# Patient Record
Sex: MALE | URBAN - METROPOLITAN AREA
[De-identification: names, ages, dates, MRNs, and addresses within clinical notes are randomized per-mention and may not be internally consistent; named-entity substitution may affect disease eponyms.]

---

## 2022-09-13 ENCOUNTER — OFFICE VISIT (OUTPATIENT)
Dept: FAMILY MEDICINE CLINIC | Age: 37
End: 2022-09-13
Payer: MEDICAID

## 2022-09-13 VITALS
DIASTOLIC BLOOD PRESSURE: 82 MMHG | HEIGHT: 65 IN | BODY MASS INDEX: 35.16 KG/M2 | WEIGHT: 211 LBS | HEART RATE: 81 BPM | OXYGEN SATURATION: 98 % | SYSTOLIC BLOOD PRESSURE: 136 MMHG

## 2022-09-13 DIAGNOSIS — Z76.89 ENCOUNTER TO ESTABLISH CARE: Primary | ICD-10-CM

## 2022-09-13 DIAGNOSIS — S76.012A STRAIN OF FLEXOR MUSCLE OF LEFT HIP, INITIAL ENCOUNTER: ICD-10-CM

## 2022-09-13 DIAGNOSIS — L91.0 KELOID OF SKIN: ICD-10-CM

## 2022-09-13 PROCEDURE — 99204 OFFICE O/P NEW MOD 45 MIN: CPT

## 2022-09-13 RX ORDER — BACLOFEN 10 MG/1
10 TABLET ORAL NIGHTLY PRN
Qty: 30 TABLET | Refills: 0 | Status: SHIPPED | OUTPATIENT
Start: 2022-09-13

## 2022-09-13 RX ORDER — METHYLPREDNISOLONE 4 MG/1
TABLET ORAL
Qty: 1 KIT | Refills: 0 | Status: SHIPPED | OUTPATIENT
Start: 2022-09-13 | End: 2022-09-19

## 2022-09-13 RX ORDER — TRIAMCINOLONE ACETONIDE 0.25 MG/G
OINTMENT TOPICAL 2 TIMES DAILY
Qty: 80 G | Refills: 1 | Status: SHIPPED | OUTPATIENT
Start: 2022-09-13 | End: 2022-09-20

## 2022-09-13 SDOH — ECONOMIC STABILITY: FOOD INSECURITY: WITHIN THE PAST 12 MONTHS, THE FOOD YOU BOUGHT JUST DIDN'T LAST AND YOU DIDN'T HAVE MONEY TO GET MORE.: NEVER TRUE

## 2022-09-13 SDOH — ECONOMIC STABILITY: TRANSPORTATION INSECURITY
IN THE PAST 12 MONTHS, HAS LACK OF TRANSPORTATION KEPT YOU FROM MEETINGS, WORK, OR FROM GETTING THINGS NEEDED FOR DAILY LIVING?: NO

## 2022-09-13 SDOH — ECONOMIC STABILITY: TRANSPORTATION INSECURITY
IN THE PAST 12 MONTHS, HAS THE LACK OF TRANSPORTATION KEPT YOU FROM MEDICAL APPOINTMENTS OR FROM GETTING MEDICATIONS?: NO

## 2022-09-13 SDOH — ECONOMIC STABILITY: FOOD INSECURITY: WITHIN THE PAST 12 MONTHS, YOU WORRIED THAT YOUR FOOD WOULD RUN OUT BEFORE YOU GOT MONEY TO BUY MORE.: NEVER TRUE

## 2022-09-13 ASSESSMENT — ENCOUNTER SYMPTOMS
EYE REDNESS: 0
SINUS PRESSURE: 0
NAUSEA: 0
ABDOMINAL PAIN: 0
WHEEZING: 0
COUGH: 0
ABDOMINAL DISTENTION: 0
SORE THROAT: 0
SINUS PAIN: 0
EYE DISCHARGE: 0
BACK PAIN: 0
DIARRHEA: 0
SHORTNESS OF BREATH: 0
EYE PAIN: 0
VOMITING: 0

## 2022-09-13 ASSESSMENT — PATIENT HEALTH QUESTIONNAIRE - PHQ9
1. LITTLE INTEREST OR PLEASURE IN DOING THINGS: 0
2. FEELING DOWN, DEPRESSED OR HOPELESS: 0
SUM OF ALL RESPONSES TO PHQ QUESTIONS 1-9: 0
SUM OF ALL RESPONSES TO PHQ9 QUESTIONS 1 & 2: 0
SUM OF ALL RESPONSES TO PHQ QUESTIONS 1-9: 0

## 2022-09-13 ASSESSMENT — SOCIAL DETERMINANTS OF HEALTH (SDOH): HOW HARD IS IT FOR YOU TO PAY FOR THE VERY BASICS LIKE FOOD, HOUSING, MEDICAL CARE, AND HEATING?: NOT HARD AT ALL

## 2022-09-13 NOTE — PROGRESS NOTES
ANJUM Herman (:  1985) is a 40 y.o. male,New patient, here for evaluation of the following chief complaint(s):  New Patient (NEW PATIENT ESTABLISH CARE left hip pain on going has had imaging when living in [place] but stated it did not show anything )         ASSESSMENT/PLAN:  1. Encounter to establish care  -Personal medical history, surgical history, and family history reviewed with the patient. Medications reconciled. -Care gaps addressed. Poor historian regarding vaccinations. Declined screenings today. He did receive a COVID-vaccine, does not have card with him.  -Refer to pharmacy to get Tdap and varicella vaccinations.  -Educated on office policies and procedures.  -Follow-up annually for physical and fasting lab work. 2. Strain of flexor muscle of left hip, initial encounter  -Sciatica versus hip strain versus knee injury. Anterior drawer, posterior drawer, and Edita were negative. Straight leg test was negative. Left hip pain worsened with internal rotation, as well as flexion.  -Treatment options discussed. Given nightly muscle cramps preventing sleep, baclofen is being sent to the pharmacy. Medication use and side effects were discussed with the patient. July Salvia is also being sent to the pharmacy. Medication use and side effects discussed with the patient.  -Educated the importance of stretching. Referral placed for physical therapy to prevent continued injury.  -Follow-up with symptoms worsen or fail to improve. -     baclofen (LIORESAL) 10 MG tablet; Take 1 tablet by mouth nightly as needed (leg cramps), Disp-30 tablet, R-0Normal  -     methylPREDNISolone (MEDROL DOSEPACK) 4 MG tablet; Take by mouth., Disp-1 kit, R-0Normal  -     Ambulatory referral to Physical Therapy  3. Keloid of skin  -Keloid versus BCC. Unchanged over 5+ years per the patient. Educated if changes occur, to let me know we can pursue dermatology.  -Regarding pruritus, treatment options discussed. Kenalog ointment is being sent to the pharmacy. Medication use and side effects discussed. Educated not using some ointment on face or genitalia at any time.  -Follow-up with symptoms worsen or fail to improve. -     triamcinolone (KENALOG) 0.025 % ointment; Apply topically 2 times daily for 7 days Apply topically 2 times daily. , Topical, 2 TIMES DAILY Starting Tue 9/13/2022, Until Tue 9/20/2022, For 7 days, Disp-80 g, R-1, Normal    Return in about 1 year (around 9/13/2023) for Physical, Fasting Labs. A [employer]  [employer] [id number] was used for the duration of the appointment. Subjective   SUBJECTIVE/OBJECTIVE:  JEANNIE Bridges presents today to establish care. [employer]  was used for the duration of the appointment. He just moved to [place] with his wife. He does not have significant medical history. He does not know his vaccination status regarding varicella or Tdap. He has a history of some skin lesions that are cleared up on his arms, one still is present on his left anterior thigh. This has not been changing over the past few years. Sometimes it is itchy. It does not bleed. He had left hip pain in [place] has evaluated the CT. CT was negative. It is worse when weightbearing. He has tingling and numbness in his left foot. Hurts to stand up and sit down. It does not radiate necessarily. Sometimes he feel like his back feels when he stands up. He has muscle cramps in his left leg at night. Small comfortable sleep on a hard and flat floor rather than a bed. He will elevate his leg at night which helps as well. He has not been taking any medications. Review of Systems   Constitutional:  Negative for chills and fever. HENT:  Negative for ear discharge, ear pain, hearing loss, sinus pressure, sinus pain and sore throat. Eyes:  Negative for pain, discharge and redness. Respiratory:  Negative for cough, shortness of breath and wheezing.     Cardiovascular:  Negative for chest pain, palpitations and leg swelling. Gastrointestinal:  Negative for abdominal distention, abdominal pain, diarrhea, nausea and vomiting. Genitourinary:  Negative for dysuria and hematuria. Musculoskeletal:  Positive for myalgias (left hip; paresthesias to left foot). Negative for arthralgias and back pain. Skin:  Negative for rash. Neurological:  Negative for weakness, numbness and headaches. Psychiatric/Behavioral:  Negative for dysphoric mood. The patient is not nervous/anxious. Objective   Physical Exam  Vitals reviewed. Constitutional:       General: He is not in acute distress. Appearance: Normal appearance. He is obese. He is not diaphoretic. HENT:      Head: Normocephalic and atraumatic. Right Ear: Tympanic membrane, ear canal and external ear normal.      Left Ear: Tympanic membrane, ear canal and external ear normal.      Nose: Nose normal.      Mouth/Throat:      Mouth: Mucous membranes are moist.      Pharynx: Oropharynx is clear. No posterior oropharyngeal erythema. Eyes:      General: No scleral icterus. Right eye: No discharge. Left eye: No discharge. Extraocular Movements: Extraocular movements intact. Pupils: Pupils are equal, round, and reactive to light. Neck:      Vascular: No carotid bruit. Cardiovascular:      Rate and Rhythm: Normal rate and regular rhythm. Pulses: Normal pulses. Heart sounds: Normal heart sounds. No murmur heard. No gallop. Pulmonary:      Effort: Pulmonary effort is normal. No respiratory distress. Breath sounds: Normal breath sounds. No wheezing or rhonchi. Chest:      Chest wall: No tenderness. Abdominal:      General: Bowel sounds are normal.      Palpations: Abdomen is soft. Tenderness: There is no abdominal tenderness. There is no guarding or rebound. Musculoskeletal:         General: Tenderness (left hip, worsened with internal rotation) present. No swelling.  Normal range of

## 2022-09-13 NOTE — PATIENT INSTRUCTIONS
[address] [facility], [address] [facility] 24   Call to schedule: [phone]  Fax: [phone]

## 2022-09-15 NOTE — FLOWSHEET NOTE
[address] [facility] [place].  [address] [place] [address]  Phone: [phone]   Fax:     [phone]  Date:  2022    Patient Name:  Bea Carmen    :  1985  MRN: [id number]    Pertinent Medical History:      Medical/Treatment Diagnosis Information:  Medical Diagnosis: Strain of flexor muscle of left hip, initial encounter [S76.012A]  Treatment Diagnosis: Lumbar hypomobility, left posterior rotated inominate, in ability to sleep well , painful gait and static postures    Insurance/Certification information:  PT Insurance Information: [address] [place] [address]  Physician Information:  Charlette Ross of care signed (Y/N): routed 22    Date of Patient follow up with Physician:      Progress Report: []  Yes  [x]  No     Date Range for reporting period:  Beginnin2022  Ending:     Progress report due (10 Rx/or 30 days whichever is less):      Recertification due (POC duration/ or 90 days whichever is less):      Visit # Insurance/POC Allowable Auth Needed    MEDICAID / NO DED / Leodis  / Haven Christy []Yes    [x]No       Functional Scale:       Date Assessed: at eval=47  Test: FOTO  Score:     Pain level:  6/10     History of Injury:Patient stated complaint:on going left hip pain that radiates to left calf he has T&N at his left foot, symptoms are constant , denies saddle numbness or issues controlling bowel // bladder  he reports that started 1 year ago, denies injury, the pain has stayed the same , Increased symptoms w/ sitting for 30 min plus , walking 20 min, running, sleeping positions,  decreased pain with meds and placing a pillow under his left leg,  works continues as a fork  full time, he reports no other activities     SUBJECTIVE:  See eval    OBJECTIVE:   Observation:   Test measurements:      RESTRICTIONS/PRECAUTIONS: Belarusian Interpretation required Exercises/Interventions:     Therapeutic Ex (28305)   Min:10 Resistance/Reps Notes/Cues                                           HEP Add 9/16    Manual Intervention (11903) Min:     Mobs     MET     DTM     Stretch      NMR re-education (56400)   Min:     Mf Activation- re-ed     TrA Re-ed activation     Glute Max re-ed activation          Therapeutic Activity (77216) Min:                         Modalities  Min:             Other Therapeutic Activities:  Pt was educated on PT POC, Diagnosis, Prognosis, pathomechanics as well as frequency and duration of scheduling future physical therapy appointments. Time was also taken on this day to answer all patient questions and participation in PT. Reviewed appointment policy in detail with patient and patient verbalized understanding. Home Exercise Program:   HEP instruction: Access Code: [email or web address]  URL: [email or web address]  Date: 09/16/2022  Prepared by: Lolita Katz     Exercises  Modified Kate Dash Stretch - 2-3 x daily - 7 x weekly - 1-2 sets - 20 reps - 3 hold  The patient demonstrated good tolerance to and understanding of the HEP. Written instructions have been issued. Therapeutic Exercise and NMR EXR  [] (21017) Provided verbal/tactile cueing for activities related to strengthening, flexibility, endurance, ROM  for improvements in proximal hip and core control with self care, mobility, lifting and ambulation.  [] (55507) Provided verbal/tactile cueing for activities related to improving balance, coordination, kinesthetic sense, posture, motor skill, proprioception  to assist with core control in self care, mobility, lifting, and ambulation.      Therapeutic Activities:    [] (18186 or 49355) Provided verbal/tactile cueing for activities related to improving balance, coordination, kinesthetic sense, posture, motor skill, proprioception and motor activation to allow for proper function  with self care and ADLs  [] (95986) Provided training and instruction to the patient for proper core and proximal hip recruitment and positioning with ambulation re-education     Home Exercise Program:    [] (74440) Reviewed/Progressed HEP activities related to strengthening, flexibility, endurance, ROM of core, proximal hip and LE for functional self-care, mobility, lifting and ambulation   [] (33201) Reviewed/Progressed HEP activities related to improving balance, coordination, kinesthetic sense, posture, motor skill, proprioception of core, proximal hip and LE for self care, mobility, lifting, and ambulation      Manual Treatments:  PROM / STM / Oscillations-Mobs:  G-I, II, III, IV (PA's, Inf., Post.)  [] (64041) Provided manual therapy to mobilize proximal hip and LS spine soft tissue/joints for the purpose of modulating pain, promoting relaxation,  increasing ROM, reducing/eliminating soft tissue swelling/inflammation/restriction, improving soft tissue extensibility and allowing for proper ROM for normal function with self care, mobility, lifting and ambulation.      Approval Dates:  CPT Code Units Approved Units Used  Date Updated:                     Charges:  Timed Code Treatment Minutes: 10   Total Treatment Minutes: 45     [] EVAL (LOW) 86426 (typically 20 minutes face-to-face)  [x] EVAL (MOD) 71506 (typically 30 minutes face-to-face)  [] EVAL (HIGH) 50162 (typically 45 minutes face-to-face)  [] RE-EVAL     [x] JH(58314) x     [] Dry needle 1 or 2 Muscles (96704)  [] NMR (68773) x     [] Dry needle 3+ Muscles (58236)  [] Manual (92458) x     [] Ultrasound (79554) x  [] TA (94320) x     [] Mech Traction (78305)  [] ES(attended) (92455)     [] ES (un) (57952):   [] Vasopump (13914) [] Ionto (66809)   [] Other:    If [facility] Please Indicate Time In/Out  CPT Code Time in Time out                                   Approval Dates:  CPT Code Units Approved Units Used  Date Updated:                     GOALS:   Patient stated goal: minimize pain permanently and get back to normal life  [] Progressing: [] Met: [] Not Met: [] Adjusted  Therapist goals for Patient:   Short Term Goals: To be achieved in: 2 weeks  1. Independent in HEP and progression per patient tolerance, in order to prevent re-injury. [] Progressing: [] Met: [] Not Met: [] Adjusted  2. Patient will have a decrease in pain 0-5/10 to facilitate improvement in movement, function, and ADLs as indicated by Functional Deficits. [] Progressing: [] Met: [] Not Met: [] Adjusted     Long Term Goals: To be achieved in: 8 weeks  1. Increase FOTO functional outcome score from 47 to 64  to assist with reaching prior level of function. [] Progressing: [] Met: [] Not Met: [] Adjusted  2. Patient will demonstrate increased AROM to WNL, good LS mobility, good hip ROM to allow for proper joint functioning as indicated by patients Functional Deficits. [] Progressing: [] Met: [] Not Met: [] Adjusted  3. Patient will demonstrate an increase in Strength to good proximal hip and core activation to allow for proper functional mobility as indicated by patients Functional Deficits. [] Progressing: [] Met: [] Not Met: [] Adjusted  4. Patient will return to sleep positions, standing, walking and lifting  functional activities without increased symptoms or restriction. [] Progressing: [] Met: [] Not Met: [] Adjusted  5. Patient will have a decrease in pain 0-2/10 to facilitate improvement in movement, function, and ADLs as indicated by Functional Deficits. [] Progressing: [] Met: [] Not Met: [] Adjusted    ASSESSMENT:  See eval    Treatment/Activity Tolerance:  [x] Patient tolerated treatment well [] Patient limited by fatique  [] Patient limited by pain  [] Patient limited by other medical complications  [] Other:     Overall Progression Towards Functional goals/ Treatment Progress Update:  [] Patient is progressing as expected towards functional goals listed. [] Progression is slowed due to complexities/Impairments listed.   [] Progression has been slowed due to co-morbidities. [x] Plan just implemented, too soon to assess goals progression <30days   [] Goals require adjustment due to lack of progress  [] Patient is not progressing as expected and requires additional follow up with physician  [] Other:    Prognosis for POC: [x] Good [] Fair  [] Poor    Patient requires continued skilled intervention: [x] Yes  [] No        PLAN: Correct left innominate , mobilize lumbar spine, centralize pain,T&N, pt ed w/ HEP and Neutral spine   [] Continue per plan of care [] Alter current plan (see comments)  [x] Plan of care initiated [] Hold pending MD visit [] Discharge    Electronically signed by: Nola Hunt, PT    Note: If patient does not return for scheduled/recommended follow up visits, this note will serve as a discharge from care along with the most recent update on progress.

## 2022-09-15 NOTE — PLAN OF CARE
min plus , walking 20 min, running, sleeping positions,  decreased pain with meds and placing a pillow under his left leg,  works continues as a fork  full time, he reports no other activities     Relevant Medical History:   Functional Outcome Measure: FOTO = 47    Pain Scale: 6/10      Living Status/Prior Level of Function: Independent with ADLs and IADLs,     OBJECTIVE:   Posture: left pelvis higher vs right all points, trochanter level , increased lumbar lordosis     Functional Mobility/Transfers: independent     Palpation: ttp left hip/ thigh     Gait: (include devices/WB status) mild antalgic pattern     Bandages/Dressings/Incisions: NA    Repeated Movements:    ROM (*increased c/o pain/ symptoms)   Comments   Lumbar Flex Minimal decrease*    Lumbar Ext Minimal decrease*      ROM LEFT RIGHT Comments   Lumbar Side Bend Minimal decrease* wnl's    Lumbar Rotation Minimal decrease* Minimal decrease    Quadrant Increased LBP Increased LBP    Hip Flexion      Hip Abd      Hip ER      Hip IR      Hip Extension      Knee Ext      Knee Flex      Hamstring Flex      Piriformis      Emmanuel test                Myotomes/Strength Normal Abnormal Comments   [x]ALL NORMAL      Hip Abd      Hip Ext      Hip flexion (L1-L2 femoral) [] [x]    Knee extension (L2-L4 femoral) [x] []    Knee flexion (S1 sciatic)  x    Dorsiflexion (L4-L5 deep peroneal) [x] []    Great Toe Ext (L5 deep peroneal nerve) [x] []    Ankle Eversion (S1-S2 super peroneal) [x] []    Ankle PF(S1-S2 tibial) [x] [] Toe walk   Multifidus [] []    Transverse Ab [] []      Dermatomes Normal Abnormal Comments   [x]ALL NORMAL            inguinal area (L1)  [] []    anterior mid-thigh (L2) [] []    distal ant thigh/med knee (L3) [] []    medial lower leg and foot (L4) [] []    lateral lower leg and foot (L5) [] []    posterior calf (S1) [] []    medial calcaneus (S2) [] []      Reflexes Normal Abnormal Comments   [x]ALL NORMAL            S1-2 Seated achilles [] [x] Decreased LLE   S1-2 Prone knee bend [] []    L3-4 Patellar tendon [] [x] Decreased LLE   C5-6 Biceps [] []    C6 Brachioradialis [] []    C7-8 Triceps [] []    Clonus [] []    Babinski [] []    Pryor's [] []      Joint mobility: lumbar, Left SIJ w/ flexion in standing    []Normal    [x]Hypo   []Hyper    Neurodynamics:     Orthopedic Special Tests:    Neural dynamic tension testing Normal Abnormal Comments   Slump Test  - Degree of knee flexion:  [] []    SLR  [] []    0-30 [] []    30-70 [] [x]    Femoral nerve (L2-4) [] []       Normal Abnormal N/A Comments   Fwd Bend-aberrant or innominate mvmt) [] [] []    Trendelenburg [x] [] []    Kemps/Quadrant [] [] []    Stork [] [] []    ANGIE/Gordon [] [] []    Hip scour [] [] []    Supine to sit [] [x] [] LLE lengthens    Prone knee bend [] [] []           Hip thrust [] [] []    SI distraction/compression [] [] []    Sacral Spring/thrust [] [] []               [x] Patient history, allergies, meds reviewed. Medical chart reviewed. See intake form. Review Of Systems (ROS):  [x]Performed Review of systems (Integumentary, CardioPulmonary, Neurological) by intake and observation. Intake form has been scanned into medical record. Patient has been instructed to contact their primary care physician regarding ROS issues if not already being addressed at this time.       Co-morbidities/Complexities (which will affect course of rehabilitation):   [x]None           Arthritic conditions   []Rheumatoid arthritis (M05.9)  []Osteoarthritis (M19.91)   Cardiovascular conditions   []Hypertension (I10)  []Hyperlipidemia (E78.5)  []Angina pectoris (I20)  []Atherosclerosis (I70)  []CVA Musculoskeletal conditions   []Disc pathology   []Congenital spine pathologies   []Prior surgical intervention  []Osteoporosis (M81.8)  []Osteopenia (M85.8)   Endocrine conditions   []Hypothyroid (E03.9)  []Hyperthyroid Gastrointestinal conditions   []Constipation (V99.65)   Metabolic conditions   []Morbid obesity (E66.01)  []Diabetes type 1(E10.65) or 2 (E11.65)   []Neuropathy (G60.9)     Pulmonary conditions   []Asthma (J45)  []Coughing   []COPD (J44.9)   Psychological Disorders  []Anxiety (F41.9)  []Depression (F32.9)   []Other:   []Other:           Barriers to/and or personal factors that will affect rehab potential:              []Age  []Sex    []Smoker              []Motivation/Lack of Motivation                        []Co-Morbidities              []Cognitive Function, education/learning barriers              [x]Environmental, home barriers              [x]profession/work barriers  []past PT/medical experience  []other:  Justification:     Falls Risk Assessment (30 days):   [x] Falls Risk assessed and no intervention required.   [] Falls Risk assessed and Patient requires intervention due to being higher risk   TUG score (>12s at risk):     [] Falls education provided, including:         ASSESSMENT: Skilled PT services are required to address the following impairments    Functional Impairments:     [x]Noted lumbar/proximal hip hypomobility   []Noted lumbosacral and/or generalized hypermobility   [x]Decreased Lumbosacral/hip/LE functional ROM   []Decreased core/proximal hip strength and neuromuscular control    []Decreased LE functional strength    [x]Abnormal reflexes/sensation/myotomal/dermatomal deficits  []Reduced balance/proprioceptive control    []other:      Functional Activity Limitations (from functional questionnaire and intake)   [x]Reduced ability to tolerate prolonged functional positions   []Reduced ability or difficulty with changes of positions or transfers between positions   [x]Reduced ability to maintain good posture and demonstrate good body mechanics with sitting, bending, and lifting   [x]Reduced ability to sleep   [] Reduced ability or tolerance with driving and/or computer work   [x]Reduced ability to perform lifting, reaching, carrying tasks   []Reduced ability to squat   [x]Reduced ability to forward bend   [x]Reduced ability to ambulate prolonged functional periods/distances/surfaces   []Reduced ability to ascend/descend stairs   []other:       Participation Restrictions   []Reduced participation in self care activities   []Reduced participation in home management activities   []Reduced participation in work activities   []Reduced participation in social activities. []Reduced participation in sport/recreational activities. Classification:   []Signs/symptoms consistent with Lumbar instability/stabilization subgroup. [x]Signs/symptoms consistent with Lumbar mobilization/manipulation subgroup, myotomes and dermatomes intact. Meets manipulation criteria. []Signs/symptoms consistent with Lumbar direction specific/centralization subgroup   []Signs/symptoms consistent with Lumbar traction subgroup       []Signs/symptoms consistent with lumbar facet dysfunction   []Signs/symptoms consistent with lumbar stenosis type dysfunction   [x]Signs/symptoms consistent with nerve root involvement including myotome & dermatome dysfunction   []Signs/symptoms consistent with post-surgical status including: decreased ROM, strength and function.    []signs/symptoms consistent with pathology which may benefit from Dry needling     []other:      Prognosis/Rehab Potential:      []Excellent   [x]Good    [x]Fair   []Poor    Tolerance of evaluation/treatment:    []Excellent   []Good    [x]Fair   []Poor     Physical Therapy Evaluation Complexity Justification  [x] A history of present problem with:  [] no personal factors and/or comorbidities that impact the plan of care;  [x]1-2 personal factors and/or comorbidities that impact the plan of care  []3 personal factors and/or comorbidities that impact the plan of care  [x] An examination of body systems using standardized tests and measures addressing any of the following: body structures and functions (impairments), activity limitations, and/or participation restrictions;:  [] a total of 1-2 or more elements   [x] a total of 3 or more elements   [] a total of 4 or more elements   [x] A clinical presentation with:  [x] stable and/or uncomplicated characteristics   [] evolving clinical presentation with changing characteristics  [] unstable and unpredictable characteristics;   [x] Clinical decision making of [] low, [x] moderate, [] high complexity using standardized patient assessment instrument and/or measurable assessment of functional outcome. [] EVAL (LOW) 68741 (typically 20 minutes face-to-face)  [x] EVAL (MOD) 78559 (typically 30 minutes face-to-face)  [] EVAL (HIGH) 11381 (typically 45 minutes face-to-face)  [] RE-EVAL     PLAN: Begin PT focusing on: proximal hip mobilizations, LB mobs, LB core activation, proximal hip activation, and HEP    Frequency/Duration:  2 days per week for 8 Weeks:  Interventions:  [x]  Therapeutic exercise including: strength training, ROM, for LE, Glutes and core   [x]  NMR activation and proprioception for glutes , LE and Core   [x]  Manual therapy as indicated for Hip complex, LE and spine to include: Dry Needling/IASTM, STM, PROM, Gr I-IV mobilizations, manipulation. [x]  Modalities as needed that may include: thermal agents, E-stim, Biofeedback, US, iontophoresis as indicated  [x]  Patient education on joint protection, postural re-education, activity modification, progression of HEP. HEP instruction: Access Code: [email or web address]  URL: [email or web address]  Date: 09/16/2022  Prepared by: Murali Rose    Exercises  Modified Makenzie Vazquez Stretch - 2-3 x daily - 7 x weekly - 1-2 sets - 20 reps - 3 hold  The patient demonstrated good tolerance to and understanding of the HEP. Written instructions have been issued. GOALS:  Patient stated goal: minimize pain permanently and get back to normal life  [] Progressing: [] Met: [] Not Met: [] Adjusted  Therapist goals for Patient:   Short Term Goals:  To be achieved in: 2 weeks  1. Independent in HEP and progression per patient tolerance, in order to prevent re-injury. [] Progressing: [] Met: [] Not Met: [] Adjusted  2. Patient will have a decrease in pain 0-5/10 to facilitate improvement in movement, function, and ADLs as indicated by Functional Deficits. [] Progressing: [] Met: [] Not Met: [] Adjusted    Long Term Goals: To be achieved in: 8 weeks  1. Increase FOTO functional outcome score from 47 to 64  to assist with reaching prior level of function. [] Progressing: [] Met: [] Not Met: [] Adjusted  2. Patient will demonstrate increased AROM to WNL, good LS mobility, good hip ROM to allow for proper joint functioning as indicated by patients Functional Deficits. [] Progressing: [] Met: [] Not Met: [] Adjusted  3. Patient will demonstrate an increase in Strength to good proximal hip and core activation to allow for proper functional mobility as indicated by patients Functional Deficits. [] Progressing: [] Met: [] Not Met: [] Adjusted  4. Patient will return to sleep positions, standing, walking and lifting  functional activities without increased symptoms or restriction. [] Progressing: [] Met: [] Not Met: [] Adjusted  5. Patient will have a decrease in pain 0-2/10 to facilitate improvement in movement, function, and ADLs as indicated by Functional Deficits. [] Progressing: [] Met: [] Not Met: [] Adjusted    Electronically signed by:  Roger Sloan PT        Note: If patient does not return for scheduled/recommended follow up visits, this note will serve as a discharge from care along with the most recent update on progress.

## 2022-09-16 ENCOUNTER — HOSPITAL ENCOUNTER (OUTPATIENT)
Dept: PHYSICAL THERAPY | Age: 37
Setting detail: THERAPIES SERIES
Discharge: HOME | End: 2022-09-16
Payer: MEDICAID

## 2022-09-16 PROCEDURE — 97110 THERAPEUTIC EXERCISES: CPT

## 2022-09-16 PROCEDURE — 97162 PT EVAL MOD COMPLEX 30 MIN: CPT

## 2022-09-19 ENCOUNTER — APPOINTMENT (OUTPATIENT)
Dept: PHYSICAL THERAPY | Age: 37
End: 2022-09-19
Payer: MEDICAID

## 2022-09-23 ENCOUNTER — HOSPITAL ENCOUNTER (OUTPATIENT)
Dept: PHYSICAL THERAPY | Age: 37
Setting detail: THERAPIES SERIES
Discharge: HOME | End: 2022-09-23
Payer: MEDICAID

## 2022-09-23 PROCEDURE — 97110 THERAPEUTIC EXERCISES: CPT

## 2022-09-23 PROCEDURE — 97140 MANUAL THERAPY 1/> REGIONS: CPT

## 2022-09-23 NOTE — FLOWSHEET NOTE
[address] [facility] [address] [place] [address]  Phone: [phone]   Fax:     [phone]  Date:  2022    Patient Name:  Alejandrina Brady    :  1985  MRN: [id number]    Pertinent Medical History:      Medical/Treatment Diagnosis Information:  Medical Diagnosis: Strain of flexor muscle of left hip, initial encounter [S76.012A]  Treatment Diagnosis: Lumbar hypomobility, left posterior rotated inominate, in ability to sleep well , painful gait and static postures    Insurance/Certification information:  PT Insurance Information: [address] [place] [address]  Physician Information:  Sarah Pulido of care signed (Y/N): routed 22    Date of Patient follow up with Physician:      Progress Report: []  Yes  [x]  No     Date Range for reporting period:  Beginnin2022  Ending:     Progress report due (10 Rx/or 30 days whichever is less):      Recertification due (POC duration/ or 90 days whichever is less):      Visit # Insurance/POC Allowable Auth Needed    MEDICAID / NO Mikaelryle Nicks /  Gamal / Gloria Marks []Yes    [x]No       Functional Scale:       Date Assessed: at eval=47  Test: FOTO  Score:     Pain level:  6/10     History of Injury:Patient stated complaint:on going left hip pain that radiates to left calf he has T&N at his left foot, symptoms are constant , denies saddle numbness or issues controlling bowel // bladder  he reports that started 1 year ago, denies injury, the pain has stayed the same , Increased symptoms w/ sitting for 30 min plus , walking 20 min, running, sleeping positions,  decreased pain with meds and placing a pillow under his left leg,  works continues as a fork  full time, 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      OBJECTIVE:   Observation:   Test measurements:   Increased pain w/ FB, BB, right  SB, Left rotation , Quadrant test L/R each increases LBP     RESTRICTIONS/PRECAUTIONS: Yi Interpretation required     Exercises/Interventions:     Therapeutic Ex (90854)   Min:20 Resistance/Reps Notes/Cues   Emmanuel stretch  30\" x 4  Baldomero fair to well    MUNIRA 1 min x 3  Reports minor left post. Thigh pain   Probe lying  1 min x 3 No c/o pain    Unilateral press up L and R ea increases left post thigh pain    B KTC 30\" x 4                    HEP 9/23 Reviewed from  9/16, added  Cues w/ Emmanuel    Manual Intervention (81643) Min:25     Mobs PA's L5 ttp, L4 to L1 baldomero. Well all gr 3   UPA's L and R L5 ea minor increased Left post thigh pain     MET     DTM Bilat TLS pvms    Stretch  C&R left rectus fem. MET Left posterior innominate    Nerve glides  Left sciatic x 10 No symptoms below knee    NMR re-education (89065)   Min:     Mf Activation- re-ed     TrA Re-ed activation     Glute Max re-ed activation          Therapeutic Activity (32400) Min:                         Modalities  Min:             Other Therapeutic Activities:  Pt was educated on PT POC, Diagnosis, Prognosis, pathomechanics as well as frequency and duration of scheduling future physical therapy appointments. Time was also taken on this day to answer all patient questions and participation in PT. Reviewed appointment policy in detail with patient and patient verbalized understanding. Home Exercise Program:   HEP instruction: Access Code: [email or web address]  URL: [email or web address]  Date: 09/16/2022  Prepared by: Murali Rose     Exercises  Modified Makenzie Vazquez Stretch - 2-3 x daily - 7 x weekly - 1-2 sets - 20 reps - 3 hold    Access Code: [email or web address]  URL: [email or web address]  Date: 09/23/2022  Prepared by: Murali Rose    Exercises  Supine Double Knee to Chest Modified - 2-3 x daily - 7 x weekly - 1 sets - 4 reps - 30 hold  Hip Flexor Stretch at Edge of Bed - 2-3 x daily - 7 x weekly - 1 sets - 4 reps - 30 hold    The patient demonstrated good tolerance to and understanding of the HEP. Written instructions have been issued. Therapeutic Exercise and NMR EXR  [] (62226) Provided verbal/tactile cueing for activities related to strengthening, flexibility, endurance, ROM  for improvements in proximal hip and core control with self care, mobility, lifting and ambulation.  [] (60213) Provided verbal/tactile cueing for activities related to improving balance, coordination, kinesthetic sense, posture, motor skill, proprioception  to assist with core control in self care, mobility, lifting, and ambulation. Therapeutic Activities:    [] (57141 or 88286) Provided verbal/tactile cueing for activities related to improving balance, coordination, kinesthetic sense, posture, motor skill, proprioception and motor activation to allow for proper function  with self care and ADLs  [] (37810) Provided training and instruction to the patient for proper core and proximal hip recruitment and positioning with ambulation re-education     Home Exercise Program:    [] (71545) Reviewed/Progressed HEP activities related to strengthening, flexibility, endurance, ROM of core, proximal hip and LE for functional self-care, mobility, lifting and ambulation   [] (68041) Reviewed/Progressed HEP activities related to improving balance, coordination, kinesthetic sense, posture, motor skill, proprioception of core, proximal hip and LE for self care, mobility, lifting, and ambulation      Manual Treatments:  PROM / STM / Oscillations-Mobs:  G-I, II, III, IV (PA's, Inf., Post.)  [] (27639) Provided manual therapy to mobilize proximal hip and LS spine soft tissue/joints for the purpose of modulating pain, promoting relaxation,  increasing ROM, reducing/eliminating soft tissue swelling/inflammation/restriction, improving soft tissue extensibility and allowing for proper ROM for normal function with self care, mobility, lifting and ambulation.      Approval Dates:  CPT Code Units Approved Units Used  Date Updated:                     Charges:  Timed Code Treatment Minutes: 45   Total Treatment Minutes: 45     [] EVAL (LOW) 36581 (typically 20 minutes face-to-face)  [] EVAL (MOD) 66753 (typically 30 minutes face-to-face)  [] EVAL (HIGH) 96202 (typically 45 minutes face-to-face)  [] RE-EVAL     [x] PK(77084) x     [] Dry needle 1 or 2 Muscles (97983)  [] NMR (04756) x     [] Dry needle 3+ Muscles (91967)  [x] Manual (47804) x 2    [] Ultrasound (96014) x  [] TA (02869) x     [] Mech Traction (87077)  [] ES(attended) (24747)     [] ES (un) (83544):   [] Vasopump (29995) [] Ionto (65835)   [] Other:    If BW Please Indicate Time In/Out  CPT Code Time in Time out                                   Approval Dates:  CPT Code Units Approved Units Used  Date Updated:                     GOALS:   Patient stated goal: minimize pain permanently and get back to normal life  [] Progressing: [] Met: [] Not Met: [] Adjusted  Therapist goals for Patient:   Short Term Goals: To be achieved in: 2 weeks  1. Independent in HEP and progression per patient tolerance, in order to prevent re-injury. [] Progressing: [] Met: [] Not Met: [] Adjusted  2. Patient will have a decrease in pain 0-5/10 to facilitate improvement in movement, function, and ADLs as indicated by Functional Deficits. [] Progressing: [] Met: [] Not Met: [] Adjusted     Long Term Goals: To be achieved in: 8 weeks  1. Increase FOTO functional outcome score from 47 to 64  to assist with reaching prior level of function. [] Progressing: [] Met: [] Not Met: [] Adjusted  2. Patient will demonstrate increased AROM to WNL, good LS mobility, good hip ROM to allow for proper joint functioning as indicated by patients Functional Deficits. [] Progressing: [] Met: [] Not Met: [] Adjusted  3.  Patient will demonstrate an increase in Strength to good proximal hip and core activation to allow for proper functional mobility as indicated by patients Functional Deficits. [] Progressing: [] Met: [] Not Met: [] Adjusted  4. Patient will return to sleep positions, standing, walking and lifting  functional activities without increased symptoms or restriction. [] Progressing: [] Met: [] Not Met: [] Adjusted  5. Patient will have a decrease in pain 0-2/10 to facilitate improvement in movement, function, and ADLs as indicated by Functional Deficits. [] Progressing: [] Met: [] Not Met: [] Adjusted    ASSESSMENT:  See eval    Treatment/Activity Tolerance:  [x] Patient tolerated treatment well [] Patient limited by fatique  [] Patient limited by pain  [] Patient limited by other medical complications  [] Other:     Overall Progression Towards Functional goals/ Treatment Progress Update:  [] Patient is progressing as expected towards functional goals listed. [] Progression is slowed due to complexities/Impairments listed. [] Progression has been slowed due to co-morbidities. [x] Plan just implemented, too soon to assess goals progression <30days   [] Goals require adjustment due to lack of progress  [] Patient is not progressing as expected and requires additional follow up with physician  [] Other:    Prognosis for POC: [x] Good [] Fair  [] Poor    Patient requires continued skilled intervention: [x] Yes  [] No        PLAN:      Correct left innominate , mobilize lumbar spine, centralize pain,T&N, pt ed w/ HEP and Neutral spine   [] Continue per plan of care [] Alter current plan (see comments)  [x] Plan of care initiated [] Hold pending MD visit [] Discharge    Electronically signed by: Zeke Klein PT    Note: If patient does not return for scheduled/recommended follow up visits, this note will serve as a discharge from care along with the most recent update on progress.

## 2022-09-26 ENCOUNTER — APPOINTMENT (OUTPATIENT)
Dept: PHYSICAL THERAPY | Age: 37
End: 2022-09-26
Payer: MEDICAID

## 2022-09-30 ENCOUNTER — HOSPITAL ENCOUNTER (OUTPATIENT)
Dept: PHYSICAL THERAPY | Age: 37
Setting detail: THERAPIES SERIES
Discharge: HOME | End: 2022-09-30
Payer: MEDICAID

## 2022-09-30 PROCEDURE — 97110 THERAPEUTIC EXERCISES: CPT

## 2022-09-30 PROCEDURE — 97140 MANUAL THERAPY 1/> REGIONS: CPT

## 2022-09-30 NOTE — FLOWSHEET NOTE
[address] [facility] [address] [place] [address]  Phone: [phone]   Fax:     [phone]  Date:  2022    Patient Name:  Yanique Sheppard    :  1985  MRN: [id number]    Pertinent Medical History:      Medical/Treatment Diagnosis Information:  Medical Diagnosis: Strain of flexor muscle of left hip, initial encounter [S76.012A]  Treatment Diagnosis: Lumbar hypomobility, left posterior rotated inominate, in ability to sleep well , painful gait and static postures    Insurance/Certification information:  PT Insurance Information: [address] [place] [address]  Physician Information:  Vamsi Gorman [facility] signed (Y/N): routed 22    Date of Patient follow up with Physician:      Progress Report: []  Yes  [x]  No     Date Range for reporting period:  Beginnin2022  Ending:     Progress report due (10 Rx/or 30 days whichever is less):      Recertification due (POC duration/ or 90 days whichever is less):      Visit # Insurance/POC Allowable Auth Needed   3/16 MEDICAID / NO EDIN / Connie Stringer / Vincenzo Aguirre []Yes    [x]No       Functional Scale:       Date Assessed: at eval=47  Test: FOTO  Score:     Pain level:  5/10     History of Injury:Patient stated complaint:on going left hip pain that radiates to left calf he has T&N at his left foot, symptoms are constant , denies saddle numbness or issues controlling bowel // bladder  he reports that started 1 year ago, denies injury, the pain has stayed the same , Increased symptoms w/ sitting for 30 min plus , walking 20 min, running, sleeping positions,  decreased pain with meds and placing a pillow under his left leg,  works continues as a fork  full time, 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    22 Pt reports he felt better after his last PT session lasting until yesterday morning, he walked a lot yesterday and his pain has since increased. Primary c/o left hamstring pain. OBJECTIVE:   Observation:   Test measurements:  9/23 Increased pain w/ FB, BB, right  SB, Left rotation , Quadrant test L/R each increases LBP     RESTRICTIONS/PRECAUTIONS: Danish Interpretation required     Exercises/Interventions:     Therapeutic Ex (50365)   Min:15 Resistance/Reps Notes/Cues   Emmanuel stretch  30\" x 4  [place] fair to well    MUNIRA 1 min x 3  Reports minor left post. Thigh pain   Probe lying  1No c/o pain    Unilateral press up    B KTC    Supine hams stretch  3\" 20 x     Lumbar rotation stretch 5 breaths ea L/R          HEP 9/30 Reviewed from 9/23 and added  Cues w/ Emmanuel    Manual Intervention (00428) Min:40     Mobs UPA's L and R L4 and L5 ea left grade 1-2 and Right grade 3-4  Gross lumbar rotation  L and R grade 3 ea   Left innominate anterior grade 3     MET     DTM Bilat TLS pvms    Stretch  C&R left rectus fem., L ITB and L hams     MET Left posterior innominate    Nerve glides  Left sciatic x 10 No symptoms below knee    NMR re-education (19466)   Min:     Mf Activation- re-ed     TrA Re-ed activation     Glute Max re-ed activation          Therapeutic Activity (31303) Min:     Pt. Ed. 9/30 Discussion/ advisement of doing HEP 2-3 x daily when experiencing increased pain, pt reported understanding                    Modalities  Min:             Other Therapeutic Activities:  Pt was educated on PT POC, Diagnosis, Prognosis, pathomechanics as well as frequency and duration of scheduling future physical therapy appointments. Time was also taken on this day to answer all patient questions and participation in PT. Reviewed appointment policy in detail with patient and patient verbalized understanding. Home Exercise Program:   HEP instruction: Access Code: [email or web address]  URL: [email or web address]  Date: 09/16/2022  Prepared by: [address] - 2-3 x daily - 7 x weekly - 1-2 sets - 20 reps - 3 hold    Access Code: [email or web address]  URL: [email or web address]  Date: 09/23/2022  Prepared by: Dorothea Diaz    Exercises  Supine Double Knee to Chest Modified - 2-3 x daily - 7 x weekly - 1 sets - 4 reps - 30 hold  Hip Flexor Stretch at Edge of Bed - 2-3 x daily - 7 x weekly - 1 sets - 4 reps - 30 hold  Access Code: [email or web address]  URL: [email or web address]  Date: 09/30/2022  Prepared by: Dorothea Diaz    Exercises  Sidelying Open Book Thoracic Lumbar Rotation and Extension - 2 x daily - 7 x weekly - 1-2 sets - 6 reps - 10 hold    The patient demonstrated good tolerance to and understanding of the HEP. Written instructions have been issued. Therapeutic Exercise and NMR EXR  [] (13741) Provided verbal/tactile cueing for activities related to strengthening, flexibility, endurance, ROM  for improvements in proximal hip and core control with self care, mobility, lifting and ambulation.  [] (95066) Provided verbal/tactile cueing for activities related to improving balance, coordination, kinesthetic sense, posture, motor skill, proprioception  to assist with core control in self care, mobility, lifting, and ambulation.      Therapeutic Activities:    [] (73161 or 49753) Provided verbal/tactile cueing for activities related to improving balance, coordination, kinesthetic sense, posture, motor skill, proprioception and motor activation to allow for proper function  with self care and ADLs  [] (11992) Provided training and instruction to the patient for proper core and proximal hip recruitment and positioning with ambulation re-education     Home Exercise Program:    [] (37857) Reviewed/Progressed HEP activities related to strengthening, flexibility, endurance, ROM of core, proximal hip and LE for functional self-care, mobility, lifting and ambulation   [] (51434) Reviewed/Progressed HEP activities related to improving balance, coordination, kinesthetic sense, posture, motor skill, proprioception of core, proximal hip and LE for self care, mobility, lifting, and ambulation      Manual Treatments:  PROM / STM / Oscillations-Mobs:  G-I, II, III, IV (PA's, Inf., Post.)  [] (32633) Provided manual therapy to mobilize proximal hip and LS spine soft tissue/joints for the purpose of modulating pain, promoting relaxation,  increasing ROM, reducing/eliminating soft tissue swelling/inflammation/restriction, improving soft tissue extensibility and allowing for proper ROM for normal function with self care, mobility, lifting and ambulation. Approval Dates:  CPT Code Units Approved Units Used  Date Updated:                     Charges:  Timed Code Treatment Minutes: 45   Total Treatment Minutes: 45     [] EVAL (LOW) 47495 (typically 20 minutes face-to-face)  [] EVAL (MOD) 07185 (typically 30 minutes face-to-face)  [] EVAL (HIGH) 63284 (typically 45 minutes face-to-face)  [] RE-EVAL     [x] TL(47863) x     [] Dry needle 1 or 2 Muscles (96139)  [] NMR (19129) x     [] Dry needle 3+ Muscles (08192)  [x] Manual (91126) x 3   [] Ultrasound (98606) x  [] TA (85613) x     [] Mech Traction (34943)  [] ES(attended) (64849)     [] ES (un) (34342):   [] Vasopump (00464) [] Ionto (77927)   [] Other:    If [facility] Please Indicate Time In/Out  CPT Code Time in Time out                                   Approval Dates:  CPT Code Units Approved Units Used  Date Updated:                     GOALS:   Patient stated goal: minimize pain permanently and get back to normal life  [] Progressing: [] Met: [] Not Met: [] Adjusted  Therapist goals for Patient:   Short Term Goals: To be achieved in: 2 weeks  1. Independent in HEP and progression per patient tolerance, in order to prevent re-injury. [] Progressing: [] Met: [] Not Met: [] Adjusted  2. Patient will have a decrease in pain 0-5/10 to facilitate improvement in movement, function, and ADLs as indicated by Functional Deficits.   [] Progressing: [] Met: [] Not Met: [] Adjusted     Long Term Goals: To be achieved in: 8 weeks  1. Increase FOTO functional outcome score from 47 to 64  to assist with reaching prior level of function. [] Progressing: [] Met: [] Not Met: [] Adjusted  2. Patient will demonstrate increased AROM to WNL, good LS mobility, good hip ROM to allow for proper joint functioning as indicated by patients Functional Deficits. [] Progressing: [] Met: [] Not Met: [] Adjusted  3. Patient will demonstrate an increase in Strength to good proximal hip and core activation to allow for proper functional mobility as indicated by patients Functional Deficits. [] Progressing: [] Met: [] Not Met: [] Adjusted  4. Patient will return to sleep positions, standing, walking and lifting  functional activities without increased symptoms or restriction. [] Progressing: [] Met: [] Not Met: [] Adjusted  5. Patient will have a decrease in pain 0-2/10 to facilitate improvement in movement, function, and ADLs as indicated by Functional Deficits. [] Progressing: [] Met: [] Not Met: [] Adjusted    ASSESSMENT:  See eval    Treatment/Activity Tolerance:  [x] Patient tolerated treatment well [] Patient limited by fatique  [] Patient limited by pain  [] Patient limited by other medical complications  [] Other:     Overall Progression Towards Functional goals/ Treatment Progress Update:  [] Patient is progressing as expected towards functional goals listed. [x] Progression is slowed due to complexities/Impairments listed. [] Progression has been slowed due to co-morbidities.   [] Plan just implemented, too soon to assess goals progression <30days   [] Goals require adjustment due to lack of progress  [] Patient is not progressing as expected and requires additional follow up with physician  [] Other:    Prognosis for POC: [x] Good [] Fair  [] Poor    Patient requires continued skilled intervention: [x] Yes  [] No        PLAN:      Correct left innominate , mobilize lumbar spine, centralize pain,T&N, pt ed w/ HEP and Neutral spine   [] Continue per plan of care [] Alter current plan (see comments)  [x] Plan of care initiated [] Hold pending MD visit [] Discharge    Electronically signed by: Lor Woo PT    Note: If patient does not return for scheduled/recommended follow up visits, this note will serve as a discharge from care along with the most recent update on progress.

## 2022-10-10 ENCOUNTER — HOSPITAL ENCOUNTER (OUTPATIENT)
Dept: PHYSICAL THERAPY | Age: 37
Setting detail: THERAPIES SERIES
Discharge: HOME | End: 2022-10-10

## 2022-10-10 NOTE — FLOWSHEET NOTE
[address] [facility] [address] [place], [place] [address]  Phone: [phone]   Fax:     [phone]    Physical Therapy  Cancellation/No-show Note  Patient Name:  Ryan Loya  :  1985   Date:  10/10/2022  Cancelled visits to date: 0  No-shows to date: 1    Patient status for today's appointment patient:  []  Cancelled  []  Rescheduled appointment  [x]  No-show     Reason given by patient:  []  Patient ill  []  Conflicting appointment  []  No transportation    []  Conflict with work  [x]  No reason given  []  Other:     Comments:      Phone call information:   [x]  Phone call made today 10/10/22 to patient at 4:40 PM_ time at number provided:      []  Patient answered, conversation as follows:    [x]  Patient did not answer, message left as follows: advised of today's missed session, attendance policy and requested a return call (department phone number given) to confirm next session.    []  Phone call not made today    Electronically signed by:  Alva Carroll, PT

## 2022-10-17 ENCOUNTER — HOSPITAL ENCOUNTER (OUTPATIENT)
Dept: PHYSICAL THERAPY | Age: 37
Setting detail: THERAPIES SERIES
Discharge: HOME | End: 2022-10-17

## 2022-10-17 NOTE — FLOWSHEET NOTE
[address] [facility] [address] [place] [address]  Phone: [phone]   Fax:     [phone]    Physical Therapy  Cancellation/No-show Note  Patient Name:  Swati Cadena  :  1985   Date:  10/17/2022  Cancelled visits to date: 0  No-shows to date: 2    Patient status for today's appointment patient:  []  Cancelled  []  Rescheduled appointment  [x]  No-show     Reason given by patient:  []  Patient ill  []  Conflicting appointment  []  No transportation    []  Conflict with work  [x]  No reason given  []  Other:     Comments:      Phone call information:   [x]  Phone call made today 10/10/22 to patient at 4:40 PM_ time at number provided:      []  Patient answered, conversation as follows:    [x]  Patient did not answer, message left as follows: advised of today's missed session, attendance policy and requested a return call (department phone number given) to confirm next session.    []  Phone call not made today    Electronically signed by:  Bronson Marie PT